# Patient Record
Sex: FEMALE | Race: WHITE | Employment: UNEMPLOYED | URBAN - METROPOLITAN AREA
[De-identification: names, ages, dates, MRNs, and addresses within clinical notes are randomized per-mention and may not be internally consistent; named-entity substitution may affect disease eponyms.]

---

## 2018-07-16 ENCOUNTER — ROUTINE PRENATAL (OUTPATIENT)
Dept: OBGYN CLINIC | Age: 28
End: 2018-07-16
Payer: MEDICAID

## 2018-07-16 ENCOUNTER — HOSPITAL ENCOUNTER (OUTPATIENT)
Age: 28
Discharge: HOME | End: 2018-07-16
Payer: MEDICAID

## 2018-07-16 ENCOUNTER — TELEPHONE (OUTPATIENT)
Dept: ADMINISTRATIVE | Age: 28
End: 2018-07-16

## 2018-07-16 VITALS
HEART RATE: 74 BPM | BODY MASS INDEX: 27 KG/M2 | SYSTOLIC BLOOD PRESSURE: 118 MMHG | DIASTOLIC BLOOD PRESSURE: 76 MMHG | HEIGHT: 61 IN | WEIGHT: 143 LBS

## 2018-07-16 DIAGNOSIS — Z34.90 PREGNANCY, UNSPECIFIED GESTATIONAL AGE: ICD-10-CM

## 2018-07-16 DIAGNOSIS — Z03.75 CERVICAL SHORTENING SUSPECTED BUT NOT FOUND: ICD-10-CM

## 2018-07-16 DIAGNOSIS — B18.2 CHRONIC HEPATITIS C WITHOUT HEPATIC COMA (HCC): ICD-10-CM

## 2018-07-16 DIAGNOSIS — Z98.890 H/O LEEP: ICD-10-CM

## 2018-07-16 DIAGNOSIS — F11.20 HEROIN ADDICTION (HCC): ICD-10-CM

## 2018-07-16 DIAGNOSIS — O09.292 IUGR (INTRAUTERINE GROWTH RESTRICTION) IN PRIOR PREGNANCY, PREGNANT, SECOND TRIMESTER: ICD-10-CM

## 2018-07-16 DIAGNOSIS — B18.2 CHRONIC HEPATITIS C WITHOUT HEPATIC COMA (HCC): Primary | ICD-10-CM

## 2018-07-16 DIAGNOSIS — O09.30 LATE PRENATAL CARE: ICD-10-CM

## 2018-07-16 DIAGNOSIS — O35.BXX0 FETAL CARDIAC ECHOGENIC FOCUS, ANTEPARTUM, SINGLE OR UNSPECIFIED FETUS: ICD-10-CM

## 2018-07-16 DIAGNOSIS — O09.891 MEDICATION EXPOSURE DURING FIRST TRIMESTER OF PREGNANCY: ICD-10-CM

## 2018-07-16 DIAGNOSIS — I05.9 MITRAL VALVE DISEASE: ICD-10-CM

## 2018-07-16 DIAGNOSIS — O34.219 PREVIOUS CESAREAN SECTION COMPLICATING PREGNANCY, ANTEPARTUM CONDITION OR COMPLICATION: ICD-10-CM

## 2018-07-16 PROBLEM — F11.10 HEROIN ABUSE (HCC): Status: ACTIVE | Noted: 2018-07-16

## 2018-07-16 LAB
ALBUMIN SERPL-MCNC: 4 G/DL (ref 3.5–5.2)
ALP BLD-CCNC: 74 U/L (ref 35–104)
ALT SERPL-CCNC: 8 U/L (ref 0–32)
AMPHETAMINE SCREEN, URINE: NOT DETECTED
ANION GAP SERPL CALCULATED.3IONS-SCNC: 14 MMOL/L (ref 7–16)
APTT: 27.9 SEC (ref 24.5–35.1)
AST SERPL-CCNC: 16 U/L (ref 0–31)
BACTERIA: ABNORMAL /HPF
BARBITURATE SCREEN URINE: NOT DETECTED
BENZODIAZEPINE SCREEN, URINE: NOT DETECTED
BILIRUB SERPL-MCNC: 0.4 MG/DL (ref 0–1.2)
BILIRUBIN URINE: NEGATIVE
BLOOD, URINE: NEGATIVE
BUN BLDV-MCNC: 9 MG/DL (ref 6–20)
CALCIUM SERPL-MCNC: 8.9 MG/DL (ref 8.6–10.2)
CANNABINOID SCREEN URINE: NOT DETECTED
CHLORIDE BLD-SCNC: 101 MMOL/L (ref 98–107)
CLARITY: CLEAR
CO2: 23 MMOL/L (ref 22–29)
COCAINE METABOLITE SCREEN URINE: NOT DETECTED
COLOR: YELLOW
CREAT SERPL-MCNC: 0.8 MG/DL (ref 0.5–1)
GFR AFRICAN AMERICAN: >60
GFR NON-AFRICAN AMERICAN: >60 ML/MIN/1.73
GLUCOSE BLD-MCNC: 76 MG/DL (ref 74–109)
GLUCOSE URINE, POC: NEGATIVE
GLUCOSE URINE: NEGATIVE MG/DL
HBA1C MFR BLD: 5 % (ref 4–5.6)
HCT VFR BLD CALC: 35 % (ref 34–48)
HEMOGLOBIN: 12.4 G/DL (ref 11.5–15.5)
INR BLD: 1
KETONES, URINE: NEGATIVE MG/DL
LEUKOCYTE ESTERASE, URINE: NEGATIVE
MCH RBC QN AUTO: 30.2 PG (ref 26–35)
MCHC RBC AUTO-ENTMCNC: 35.4 % (ref 32–34.5)
MCV RBC AUTO: 85.2 FL (ref 80–99.9)
METHADONE SCREEN, URINE: NOT DETECTED
MISC CHEMISTRY: NORMAL
NITRITE, URINE: NEGATIVE
OPIATE SCREEN URINE: NOT DETECTED
PDW BLD-RTO: 12.7 FL (ref 11.5–15)
PH UA: 6.5 (ref 5–9)
PHENCYCLIDINE SCREEN URINE: NOT DETECTED
PLATELET # BLD: 214 E9/L (ref 130–450)
PMV BLD AUTO: 10.6 FL (ref 7–12)
POTASSIUM SERPL-SCNC: 3.9 MMOL/L (ref 3.5–5)
PROPOXYPHENE SCREEN: NOT DETECTED
PROTEIN UA: NEGATIVE
PROTEIN UA: NEGATIVE MG/DL
PROTHROMBIN TIME: 10.9 SEC (ref 9.3–12.4)
RBC # BLD: 4.11 E12/L (ref 3.5–5.5)
RBC UA: ABNORMAL /HPF (ref 0–2)
SODIUM BLD-SCNC: 138 MMOL/L (ref 132–146)
SPECIFIC GRAVITY UA: 1.02 (ref 1–1.03)
T4 FREE: 1.43 NG/DL (ref 0.93–1.7)
TOTAL PROTEIN: 7 G/DL (ref 6.4–8.3)
TSH SERPL DL<=0.05 MIU/L-ACNC: 1.33 UIU/ML (ref 0.27–4.2)
UROBILINOGEN, URINE: 0.2 E.U./DL
WBC # BLD: 10.4 E9/L (ref 4.5–11.5)
WBC UA: ABNORMAL /HPF (ref 0–5)

## 2018-07-16 PROCEDURE — 76811 OB US DETAILED SNGL FETUS: CPT | Performed by: OBSTETRICS & GYNECOLOGY

## 2018-07-16 PROCEDURE — 99201 HC NEW PT, E/M LEVEL 1: CPT | Performed by: OBSTETRICS & GYNECOLOGY

## 2018-07-16 PROCEDURE — 81002 URINALYSIS NONAUTO W/O SCOPE: CPT | Performed by: OBSTETRICS & GYNECOLOGY

## 2018-07-16 PROCEDURE — G8419 CALC BMI OUT NRM PARAM NOF/U: HCPCS | Performed by: OBSTETRICS & GYNECOLOGY

## 2018-07-16 PROCEDURE — 84443 ASSAY THYROID STIM HORMONE: CPT

## 2018-07-16 PROCEDURE — 81001 URINALYSIS AUTO W/SCOPE: CPT

## 2018-07-16 PROCEDURE — G8427 DOCREV CUR MEDS BY ELIG CLIN: HCPCS | Performed by: OBSTETRICS & GYNECOLOGY

## 2018-07-16 PROCEDURE — 99244 OFF/OP CNSLTJ NEW/EST MOD 40: CPT | Performed by: OBSTETRICS & GYNECOLOGY

## 2018-07-16 PROCEDURE — 87088 URINE BACTERIA CULTURE: CPT

## 2018-07-16 PROCEDURE — 83036 HEMOGLOBIN GLYCOSYLATED A1C: CPT

## 2018-07-16 PROCEDURE — 85027 COMPLETE CBC AUTOMATED: CPT

## 2018-07-16 PROCEDURE — 85730 THROMBOPLASTIN TIME PARTIAL: CPT

## 2018-07-16 PROCEDURE — 84439 ASSAY OF FREE THYROXINE: CPT

## 2018-07-16 PROCEDURE — 87522 HEPATITIS C REVRS TRNSCRPJ: CPT

## 2018-07-16 PROCEDURE — 80074 ACUTE HEPATITIS PANEL: CPT

## 2018-07-16 PROCEDURE — 36415 COLL VENOUS BLD VENIPUNCTURE: CPT

## 2018-07-16 PROCEDURE — 76817 TRANSVAGINAL US OBSTETRIC: CPT | Performed by: OBSTETRICS & GYNECOLOGY

## 2018-07-16 PROCEDURE — 80053 COMPREHEN METABOLIC PANEL: CPT

## 2018-07-16 PROCEDURE — 85610 PROTHROMBIN TIME: CPT

## 2018-07-16 PROCEDURE — 80307 DRUG TEST PRSMV CHEM ANLYZR: CPT

## 2018-07-16 RX ORDER — TRAZODONE HYDROCHLORIDE 50 MG/1
TABLET ORAL
COMMUNITY

## 2018-07-16 NOTE — PATIENT INSTRUCTIONS
Call your primary obstetrician with bleeding, leaking of fluid, abdominal tenderness, headache, blurry vision, epigastric pain and increased urinary frequency. Any questions contact Cassie at [phone]. Patient Education        Weeks 18 to 22 of Your Pregnancy: Care Instructions  Your Care Instructions    Your baby is continuing to develop quickly. At this stage, babies can now suck their thumbs,  firmly with their hands, and open and close their eyelids. Sometime between 18 and 22 weeks, you will start to feel your baby move. At first, these small fetal movements feel like fluttering or \"butterflies. \" Some women say that they feel like gas bubbles. As the baby grows, these movements will become stronger. You may also notice that your baby kicks and hiccups. During this time, you may find that your nausea and fatigue are gone. Overall, you may feel better and have more energy than you did in your first trimester. But you may also have new discomforts now, such as sleep problems or leg cramps. This care sheet can help you ease these discomforts. Follow-up care is a key part of your treatment and safety. Be sure to make and go to all appointments, and call your doctor if you are having problems. It's also a good idea to know your test results and keep a list of the medicines you take. How can you care for yourself at home? Ease sleep problems  · Avoid caffeine in drinks or chocolate late in the day. · Get some exercise every day. · Take a warm shower or bath before bed. · Have a light snack or glass of milk at bedtime. · Do relaxation exercises in bed to calm your mind and body. · Support your legs and back with extra pillows. Try a pillow between your legs if you sleep on your side. · Do not use sleeping pills or alcohol. They could harm your baby. Ease leg cramps  · Do not massage your calf during the cramp. · Sit on a firm bed or chair.  Straighten your leg, and bend your foot (flex your ankle) deliver your baby and can't make it safely to the hospital.  Call your doctor now or seek immediate medical care if:  · You have vaginal bleeding. · You have belly pain. · You have a fever. · You have symptoms of preeclampsia, such as:  ¨ Sudden swelling of your face, hands, or feet. ¨ New vision problems (such as dimness or blurring). ¨ A severe headache. · You have a sudden release of fluid from your vagina. (You think your water broke.)  · You think that you may be in labor. This means that you've had at least 4 contractions within 20 minutes or at least 8 contractions in an hour. · You notice that your baby has stopped moving or is moving much less than normal.  · You have symptoms of a urinary tract infection. These may include:  ¨ Pain or burning when you urinate. ¨ A frequent need to urinate without being able to pass much urine. ¨ Pain in the flank, which is just below the rib cage and above the waist on either side of the back. ¨ Blood in your urine. Watch closely for changes in your health, and be sure to contact your doctor if:  · You have vaginal discharge that smells bad. · You have skin changes, such as:  ¨ A rash. ¨ Itching. ¨ Yellow color to your skin. · You have other concerns about your pregnancy. If you have labor signs at 37 weeks or more  If you have signs of labor at 37 weeks or more, your doctor may tell you to call when your labor becomes more active. Symptoms of active labor include:  · Contractions that are regular. · Contractions that are less than 5 minutes apart. · Contractions that are hard to talk through. Follow-up care is a key part of your treatment and safety. Be sure to make and go to all appointments, and call your doctor if you are having problems. It's also a good idea to know your test results and keep a list of the medicines you take. Where can you learn more? Go to https://seth.[employer]. org and sign in to your [employer] account.  Enter  in

## 2018-07-16 NOTE — LETTER
[place]. Blood transfusion, is now an uncommon cause of recently acquired infections. Sexual transmission of HCV appears to be inefficient relative to hepatitis B virus (HBV). Transmission between sexual partners of persons with chronic HCV infection with no other risk factors for infection is about 5% (range, 0% to 15%). Household contact with an infected person has been associated with a nonsexual transmission rate of 4% (range, 0% to 11%). Approximately 7-8% of hepatitis C virus-positive women transmit hepatitis C virus to their offspring with a higher rate of transmission seen in women coinfected with HIV. Acute HCV infection progresses to chronic HCV infection in most persons (75%--85%). Cirrhosis develops in 10%-20% of persons with chronic hepatitis C and hepatocellualr carcinoma in 1%-5%. In one small study acute maternal hepatitis (type B or nontype B) had no effect on the incidence of congenital malformations, stillbirths, abortions, or intrauterine malnutrition. However, acute hepatitis did increase the incidence of prematurity. Pregnancy does not appear to be adversely affected by chronic HCV. The diagnosis of HCV infection can be made by detecting either anti-HCV by enzyme immunoassay (EIA) or HCV RNA using the reverse transcriptase polymerase chain reaction (RT-PCR). If the HCV RNA result is negative supplemental testing should be performed. The CDC recommends confirmation of a positive EIA with supplemental recombinant immunoblot assay (RIBA TM) or RT-PCR for HCV RNA. (Figure 1). Supplemenatal testing using RIBA TM may be run on the same sample as the EIA. If RT-PCR is used to confirm anti-HCV results, a separate serum sample will need to be collected. The present supplemental RIBA TM detects four viral antigens. The test is considered positive if at least two antigens are detected. The test is indeterminate if only one antigen is detected. If the RIBA TMis indeterminate , further laboratory testing might include repeating the anti-HCV in two or more months or testing for HCV RNA and ALT level. Table. may be helpful at arriving at a proper diagnosis. Table 1: Use of diagnostic tests in hepatitis C   Category CLARE RIBA HCV RNA ALT   Chronic hepatitis C Positive Positive Positive Raised   Hepatitis C carrier Positive Positive Positive Normal   Recovered HCV infection Positive Positive Negative Normal   False positive anti-HCV Positive Negative Negative Normal   CLARE=anti-HCV by enzyme-linked immunoassay; RIBA=anti-HCV by recombinant immunoblot assay; ALT=alanine aminotransferase. From Imani Jason AM. Hepatitis C Lancet 1998; 351: 351-55   Antepartum:    Treatment  Pregnant women with hepatitis C should be referred to specialists with experience in the treatment of hepatitis C. Current approved therapy for HCV-related chronic liver disease includes alpha interferon alone or in combination with the oral agent ribavirin. Alpha-interferon-2b and ribavirin are the current treatment. Interferon does not appear to have an adverse affect the embryo or fetus. However, the data is limited, and the potential benefits of interferon use during pregnancy should clearly outweigh possible hazards. Because there are no large studies of ribavirin use during human pregnancy, and ribavirin is teratogenic in multiple animal species, the use of ribavirin during pregnancy is presently contraindicated. Pregnant patients with hepatitis C should be advised to: Obtain vaccination against hepatitis viruses A and B if they test negative for these antibodies. They should abstain form alcohol use.  They should avoid hepatotoxic drugs such as acetaminophen (Tylenol) that may worsen liver damage. The pediatrician should be Informed of the of the mothers hepatitis C status. The patient should not  donate blood, body organs, other tissue, or semen. They should not share any personal items that may have blood on them (e.g., toothbrushes and razors). The patient should be advised the low, but present risk for transmission of hepatitis C to their partner and family. A comprehensive metabolic panel, PTT, PT, INR and HCV RNA titers should be obtained at the beginning of pregnancy, and as needed thereafter. Testing for HIV should be obtained. These studies may need to be repeated depending on the patient's risk assessment and ongoing potential for exposure to the virus, such as continued at risk behavior, sexual practices and ongoing IV drug abuse. The following recommendations from The Society of Obstetricians and Gynecologists of [facility]) may be helpful in counseling women considering amniocentesis. [facility] Recommendations:    Amniocentesis in women infected with hepatitis C does not appear to significantly increase the risk of vertical transmission, but women should be counseled that very few studies have properly addressed this possibility. In HIV-positive women all noninvasive screening tools should be used prior to considering amniocentesis. For women infected with hepatitis B, hepatitis C, or HIV, the addition of noninvasive methods of prenatal risk screening, such as first trimester nuchal translucency screening, cell free fetal DNA testing, and anatomic ultrasound, may help in reducing the age-related risk to a level below the threshold for genetic amniocentesis.  For those women infected with hepatitis B,  MRSA (methicillin resistant staph aureus) culture positive 10/21/15    blood & urine    Narcotic abuse        Past Surgical History:   Procedure Laterality Date     SECTION      3     DILATION AND CURETTAGE      D&E     ALLERGIES:   Allergies   Allergen Reactions    Hydrocodone Hives       Current Outpatient Prescriptions:     traZODone (DESYREL) 50 MG tablet, Take by mouth, Disp: , Rfl:     Prenatal Vit-Fe Fumarate-FA (PRENATAL VITAMIN PO), Take by mouth, Disp: , Rfl:     Social History   Substance Use Topics    Smoking status: Current Every Day Smoker     Packs/day: 0.50     Types: Cigarettes    Smokeless tobacco: Never Used    Alcohol use No       FAMILY MEDICAL HISTORY:   Negative for congenital abnormalities, autism, genetic disease and mental retardation, not listed above. Review of Systems :   CONSTITUTIONAL : No fever, no chills   HEENT : No headache, no visual changes, no rhinorrhea, no sore throat   CARDIOVASCULAR : No pain, no palpitations, no edema   RESPIRATORY : No pain, no shortness of breath   GASTROINTESTINAL : No N/V, no D/C, no abdominal pain   GENITOURINARY : No dysuria, hematuria and no incontinence   MUSCULOSKELETAL : No myalgia, No back pain  NEUROLOGICAL : No numbness, no tingling, no tremors. No history of seizures  ALL OTHER SYSTEMS WERE REPORTED AS NEGATIVE. PERTINENT PHYSICAL EXAMINATION:   /76   Pulse 74   Ht 5' 1\" (1.549 m)   Wt 143 lb (64.9 kg)   LMP  (LMP Unknown)   BMI 27.02 kg/m²   Urine dipstick:   Negative for Glucose    Negative for Albumin    GENERAL:   The patient is a well developed, female who is alert cooperative and oriented times three in no acute distress. HEENT:  Normo cephalic and atraumatic. No facial edema. ABDOMEN:   Her uterus is gravid. She had no complaint of abdominal pain or tenderness. The fetal heart rate is 142 bpm. The fetus is in the transverse lie, which was confirmed by the ultrasound assessment. EXTREMITIES:  No peripheral edema is noted. PELVIC EXAMINATION:  Transvaginal ultrasound assessment of the cervix was performed. The cervical length was 42 mm, without funneling of the amniotic membranes. A fetal ultrasound assessment was performed today. A report is enclosed for your review. IMPRESSION:    1. IUP at 18 weeks 3 days gestation based on her JOHNSON: 12/14/18 by ultrasound on 7/16/2018    2. Heroin and meth addiction. Last admitted use 7/14/2017. 3. History of MRSA mitral valve infection     4. Took Vivitrol, Trazodone and Depo Provera    5. Three previous C-sections    6. ETOP 2/17/2018     7. Late prenatal care    8. Chronic hepatitis C diagnosed in 2009    9. Currently in a court mandated drug program in [place], [place]. Last Vivitrol dose was 5/20/2018  11. Not getting maintenance therapy   12. The patient is leaving this area and moving to [facility]. She is to follow at [facility]     13. Left EIF  14. Declined diagnostic genetic testing    15. Received Dep Provera on 3/15/2018   16. O positive blood type    PLAN:  The patient was referred for a cardiology consultation which is to include an EKG and echocardiogram.  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 The patient should be vaccinated for hepatitis B and if this has not yet been done. She was also referred to gastroenterology for evaluation and management of her hepatitis C. Laboratory studies were ordered including a CMP, CBC, aPTT, PTT/INR, hepatitis C genotype, hepatitis C viral load, urine drug screen, and urinalysis with culture and sensitivity. The patient should follow with an addiction specialist for ongoing evaluation and management of her substance abuse. She is at increased risk for relapsing during the course of her pregnancy and in the postpartum period.   She is also at increased risk for developing depression during her pregnancy and in the postpartum period. The patient was advised to call if she has any increased vaginal discharge, vaginal bleeding, contractions, abdominal pain, back pain or any new significant symptomatology prior to her next visit. I advised her that these are signs and symptoms of cervical change and require follow-up assessment when they occur. No further follow-up is scheduled in ur office, since the patient is leaving the area. She is to have her ongoing medical care at [facility] where she delivered her previous child. The patient is to continue to follow with you in your office for ongoing obstetric care. I spent 62 minutes of direct contact time with the patient of which greater than 50% of the time was to discuss complications and problems related to her pregnancy. I answered all of her questions to her satisfaction. I asked her to call if she had any additional questions prior to her next visit. If you have any questions regarding her management, please contact me at your convenience and thank you for allowing me to participate in her care.     Sincerely,        Yumiko Gamez MD, MS, Diana Jett, DIOR, RDMS, RVT  Director [address] [place] [address]  [phone]

## 2018-07-17 LAB
HAV IGM SER IA-ACNC: ABNORMAL
HEPATITIS B CORE IGM ANTIBODY: ABNORMAL
HEPATITIS B SURFACE ANTIGEN INTERPRETATION: ABNORMAL
HEPATITIS C ANTIBODY INTERPRETATION: REACTIVE

## 2018-07-18 LAB — URINE CULTURE, ROUTINE: NORMAL

## 2018-07-19 LAB
Lab: NORMAL
REPORT: NORMAL
THIS TEST SENT TO: NORMAL